# Patient Record
Sex: FEMALE | Race: WHITE | NOT HISPANIC OR LATINO | Employment: OTHER
[De-identification: names, ages, dates, MRNs, and addresses within clinical notes are randomized per-mention and may not be internally consistent; named-entity substitution may affect disease eponyms.]

---

## 2017-05-05 ENCOUNTER — CHARTING TRANS (OUTPATIENT)
Dept: OTHER | Age: 62
End: 2017-05-05

## 2017-06-22 ENCOUNTER — HOSPITAL (OUTPATIENT)
Dept: OTHER | Age: 62
End: 2017-06-22
Attending: OBSTETRICS & GYNECOLOGY

## 2017-06-22 ENCOUNTER — IMAGING SERVICES (OUTPATIENT)
Dept: OTHER | Age: 62
End: 2017-06-22

## 2017-10-06 PROCEDURE — 87493 C DIFF AMPLIFIED PROBE: CPT | Performed by: PHYSICIAN ASSISTANT

## 2017-10-06 PROCEDURE — 87427 SHIGA-LIKE TOXIN AG IA: CPT | Performed by: PHYSICIAN ASSISTANT

## 2017-10-06 PROCEDURE — 87045 FECES CULTURE AEROBIC BACT: CPT | Performed by: PHYSICIAN ASSISTANT

## 2017-10-06 PROCEDURE — 87046 STOOL CULTR AEROBIC BACT EA: CPT | Performed by: PHYSICIAN ASSISTANT

## 2017-12-20 ENCOUNTER — IMAGING SERVICES (OUTPATIENT)
Dept: OTHER | Age: 62
End: 2017-12-20

## 2017-12-20 ENCOUNTER — HOSPITAL (OUTPATIENT)
Dept: OTHER | Age: 62
End: 2017-12-20
Attending: OBSTETRICS & GYNECOLOGY

## 2018-09-28 ENCOUNTER — TELEPHONE (OUTPATIENT)
Dept: NEUROLOGY | Facility: CLINIC | Age: 63
End: 2018-09-28

## 2018-09-28 NOTE — TELEPHONE ENCOUNTER
Notes from patients eye exam were received on 9/28/18 and placed in Dr folder for review. Patient has appointment on 10/16/18.

## 2018-10-12 ENCOUNTER — HOSPITAL ENCOUNTER (OUTPATIENT)
Dept: MRI IMAGING | Facility: HOSPITAL | Age: 63
Discharge: HOME | End: 2018-10-12
Attending: OPHTHALMOLOGY
Payer: MEDICARE

## 2018-10-12 DIAGNOSIS — G43.109 CLASSICAL MIGRAINE: ICD-10-CM

## 2018-10-12 PROCEDURE — 70553 MRI BRAIN STEM W/O & W/DYE: CPT | Performed by: OPHTHALMOLOGY

## 2018-10-12 PROCEDURE — A9575 INJ GADOTERATE MEGLUMI 0.1ML: HCPCS | Performed by: OPHTHALMOLOGY

## 2018-10-15 ENCOUNTER — IMAGING SERVICES (OUTPATIENT)
Dept: OTHER | Age: 63
End: 2018-10-15

## 2018-10-15 ENCOUNTER — HOSPITAL (OUTPATIENT)
Dept: OTHER | Age: 63
End: 2018-10-15
Attending: OBSTETRICS & GYNECOLOGY

## 2018-10-16 ENCOUNTER — OFFICE VISIT (OUTPATIENT)
Dept: NEUROLOGY | Facility: CLINIC | Age: 63
End: 2018-10-16
Payer: MEDICARE

## 2018-10-16 VITALS
WEIGHT: 165 LBS | DIASTOLIC BLOOD PRESSURE: 70 MMHG | BODY MASS INDEX: 27 KG/M2 | HEART RATE: 88 BPM | RESPIRATION RATE: 16 BRPM | SYSTOLIC BLOOD PRESSURE: 110 MMHG

## 2018-10-16 DIAGNOSIS — G43.109 OCULAR MIGRAINE: ICD-10-CM

## 2018-10-16 DIAGNOSIS — G43.109 MIGRAINE WITH AURA AND WITHOUT STATUS MIGRAINOSUS, NOT INTRACTABLE: ICD-10-CM

## 2018-10-16 DIAGNOSIS — H53.9 VISUAL CHANGES: Primary | ICD-10-CM

## 2018-10-16 PROCEDURE — 99204 OFFICE O/P NEW MOD 45 MIN: CPT | Performed by: OTHER

## 2018-10-16 NOTE — PROGRESS NOTES
[facility]   Neurology; INITIAL CLINIC VISIT  10/16/2018, 2:59 PM     Gold Isaac Milton Patient Status:  No patient class for patient encounter    1955 MRN [id number]   Location [address] [place] [address], Allergies    MEDICATIONS:    Current Outpatient Medications:  latanoprost 0.005 % Ophthalmic Solution INT 1 GTT INTO OU NIGHTLY Disp:  Rfl: 5         REVIEW OF SYSTEMS:    GENERAL HEALTH:  feels well, calm, normal appetite,   EYES: no visual complaints or Horrizontal and vertical movements normal.  Normal pursuit and saccades.                             No nystagmus, no ptosis       CN V: Masseters strong, Facial sensations normal,        CN  VII:  Symmetric facial movement       CN VIII:  Normal hearing Neuromuscular/ Electrodiagnostic Specialist  [facility] PATRICIA [facility]  10/16/2018

## 2018-10-16 NOTE — PROGRESS NOTES
New patient here for evaluation of vision disturbances, \"a while\", not going away, sees \"waves\" and sensitive to light.

## 2018-10-16 NOTE — PATIENT INSTRUCTIONS
Refill policies:    • Allow 2-3 business days for refills; controlled substances may take longer.   • Contact your pharmacy at least 5 days prior to running out of medication and have them send an electronic request or submit request through the “request re entire amount billed. Precertification and Prior Authorizations: If your physician has recommended that you have a procedure or additional testing performed.   [facility]) will contact your insurance carrier to obtain pre-certi

## 2018-12-17 ENCOUNTER — OFFICE VISIT (OUTPATIENT)
Dept: OBGYN | Age: 63
End: 2018-12-17

## 2018-12-17 VITALS
DIASTOLIC BLOOD PRESSURE: 80 MMHG | WEIGHT: 168.5 LBS | HEIGHT: 66 IN | SYSTOLIC BLOOD PRESSURE: 122 MMHG | BODY MASS INDEX: 27.08 KG/M2

## 2018-12-17 DIAGNOSIS — Z11.51 SCREENING FOR HUMAN PAPILLOMAVIRUS (HPV): ICD-10-CM

## 2018-12-17 DIAGNOSIS — Z01.419 ENCOUNTER FOR ROUTINE GYNECOLOGICAL EXAMINATION WITH PAPANICOLAOU SMEAR OF CERVIX: Primary | ICD-10-CM

## 2018-12-17 PROBLEM — Z78.0 POST-MENOPAUSAL: Status: ACTIVE | Noted: 2018-12-17

## 2018-12-17 PROBLEM — E16.2 HYPOGLYCEMIA: Status: ACTIVE | Noted: 2018-12-17

## 2018-12-17 PROBLEM — E03.9 HYPOTHYROID: Status: ACTIVE | Noted: 2018-12-17

## 2018-12-17 PROBLEM — M85.80 OSTEOPENIA: Status: ACTIVE | Noted: 2018-12-17

## 2018-12-17 PROCEDURE — G0101 CA SCREEN;PELVIC/BREAST EXAM: HCPCS | Performed by: OBSTETRICS & GYNECOLOGY

## 2018-12-17 RX ORDER — LATANOPROST 50 UG/ML
SOLUTION/ DROPS OPHTHALMIC
COMMUNITY
Start: 2017-08-11

## 2018-12-17 SDOH — SOCIAL STABILITY: SOCIAL INSECURITY
WITHIN THE LAST YEAR, HAVE YOU BEEN KICKED, HIT, SLAPPED, OR OTHERWISE PHYSICALLY HURT BY YOUR PARTNER OR EX-PARTNER?: NO

## 2018-12-17 SDOH — SOCIAL STABILITY: SOCIAL INSECURITY: WITHIN THE LAST YEAR, HAVE YOU BEEN AFRAID OF YOUR PARTNER OR EX-PARTNER?: NO

## 2018-12-17 SDOH — ECONOMIC STABILITY: INCOME INSECURITY: HOW HARD IS IT FOR YOU TO PAY FOR THE VERY BASICS LIKE FOOD, HOUSING, MEDICAL CARE, AND HEATING?: PATIENT DECLINED

## 2018-12-17 SDOH — SOCIAL STABILITY: SOCIAL INSECURITY
WITHIN THE LAST YEAR, HAVE TO BEEN RAPED OR FORCED TO HAVE ANY KIND OF SEXUAL ACTIVITY BY YOUR PARTNER OR EX-PARTNER?: NO

## 2018-12-17 SDOH — SOCIAL STABILITY: SOCIAL INSECURITY: WITHIN THE LAST YEAR, HAVE YOU BEEN HUMILIATED OR EMOTIONALLY ABUSED IN OTHER WAYS BY YOUR PARTNER OR EX-PARTNER?: NO

## 2018-12-26 LAB — HPV16+18+45 E6+E7MRNA CVX NAA+PROBE: NORMAL

## 2018-12-26 RX ORDER — FESOTERODINE FUMARATE 4 MG/1
4 TABLET, EXTENDED RELEASE ORAL DAILY
Qty: 90 TABLET | Refills: 3 | Status: SHIPPED | OUTPATIENT
Start: 2018-12-26 | End: 2019-12-18 | Stop reason: ALTCHOICE

## 2019-01-01 ENCOUNTER — EXTERNAL RECORD (OUTPATIENT)
Dept: HEALTH INFORMATION MANAGEMENT | Facility: OTHER | Age: 64
End: 2019-01-01

## 2019-05-20 ENCOUNTER — TELEPHONE (OUTPATIENT)
Dept: OBGYN | Age: 64
End: 2019-05-20

## 2019-05-29 RX ORDER — ESTRADIOL 0.52 MG/.87G
GEL, METERED TOPICAL
Qty: 52 G | Refills: 3 | Status: SHIPPED | OUTPATIENT
Start: 2019-05-29 | End: 2019-12-18 | Stop reason: SDUPTHER

## 2019-08-20 DIAGNOSIS — Z12.39 SCREENING FOR MALIGNANT NEOPLASM OF BREAST: Primary | ICD-10-CM

## 2019-08-28 ENCOUNTER — TELEPHONE (OUTPATIENT)
Dept: SCHEDULING | Age: 64
End: 2019-08-28

## 2019-08-30 ENCOUNTER — TELEPHONE (OUTPATIENT)
Dept: NEUROLOGY | Facility: CLINIC | Age: 64
End: 2019-08-30

## 2019-10-22 ENCOUNTER — HOSPITAL (OUTPATIENT)
Dept: OTHER | Age: 64
End: 2019-10-22
Attending: OBSTETRICS & GYNECOLOGY

## 2019-10-22 DIAGNOSIS — R92.8 ABNORMAL MAMMOGRAM: Primary | ICD-10-CM

## 2019-10-29 ENCOUNTER — HOSPITAL (OUTPATIENT)
Dept: OTHER | Age: 64
End: 2019-10-29
Attending: OBSTETRICS & GYNECOLOGY

## 2019-12-02 PROBLEM — H53.9 VISUAL CHANGES: Status: RESOLVED | Noted: 2018-10-16 | Resolved: 2019-12-02

## 2019-12-02 PROBLEM — E16.2 HYPOGLYCEMIA: Status: ACTIVE | Noted: 2018-12-17

## 2019-12-02 PROBLEM — M85.80 OSTEOPENIA: Status: ACTIVE | Noted: 2018-12-17

## 2019-12-02 PROBLEM — G43.109 OCULAR MIGRAINE: Status: RESOLVED | Noted: 2018-10-16 | Resolved: 2019-12-02

## 2019-12-02 PROBLEM — M54.50 LOW BACK PAIN: Status: ACTIVE | Noted: 2017-01-25

## 2019-12-02 PROBLEM — G43.109 MIGRAINE WITH AURA: Status: RESOLVED | Noted: 2018-10-16 | Resolved: 2019-12-02

## 2019-12-02 PROBLEM — N32.81 OAB (OVERACTIVE BLADDER): Status: ACTIVE | Noted: 2019-12-02

## 2019-12-18 ENCOUNTER — OFFICE VISIT (OUTPATIENT)
Dept: OBGYN | Age: 64
End: 2019-12-18

## 2019-12-18 VITALS
WEIGHT: 169.6 LBS | DIASTOLIC BLOOD PRESSURE: 80 MMHG | HEIGHT: 66 IN | SYSTOLIC BLOOD PRESSURE: 122 MMHG | BODY MASS INDEX: 27.26 KG/M2

## 2019-12-18 DIAGNOSIS — Z78.0 POST-MENOPAUSAL: ICD-10-CM

## 2019-12-18 DIAGNOSIS — N32.81 OVERACTIVE BLADDER: Primary | ICD-10-CM

## 2019-12-18 PROCEDURE — 99214 OFFICE O/P EST MOD 30 MIN: CPT | Performed by: OBSTETRICS & GYNECOLOGY

## 2019-12-18 ASSESSMENT — PATIENT HEALTH QUESTIONNAIRE - PHQ9
SUM OF ALL RESPONSES TO PHQ9 QUESTIONS 1 AND 2: 0
1. LITTLE INTEREST OR PLEASURE IN DOING THINGS: NOT AT ALL
SUM OF ALL RESPONSES TO PHQ9 QUESTIONS 1 AND 2: 0
2. FEELING DOWN, DEPRESSED OR HOPELESS: NOT AT ALL

## 2020-07-09 ENCOUNTER — LAB ENCOUNTER (OUTPATIENT)
Dept: LAB | Facility: HOSPITAL | Age: 65
End: 2020-07-09
Attending: NURSE PRACTITIONER
Payer: MEDICARE

## 2020-07-09 DIAGNOSIS — Z96.649 HIP JOINT REPLACEMENT BY OTHER MEANS: Primary | ICD-10-CM

## 2020-07-09 LAB
BASOPHILS # BLD AUTO: 0.03 X10(3) UL (ref 0–0.2)
BASOPHILS NFR BLD AUTO: 0.7 %
CRP SERPL-MCNC: <0.29 MG/DL (ref ?–0.3)
DEPRECATED RDW RBC AUTO: 46 FL (ref 35.1–46.3)
EOSINOPHIL # BLD AUTO: 0.17 X10(3) UL (ref 0–0.7)
EOSINOPHIL NFR BLD AUTO: 4 %
ERYTHROCYTE [DISTWIDTH] IN BLOOD BY AUTOMATED COUNT: 13.4 % (ref 11–15)
HCT VFR BLD AUTO: 40.9 % (ref 35–48)
HGB BLD-MCNC: 13.1 G/DL (ref 12–16)
IMM GRANULOCYTES # BLD AUTO: 0.01 X10(3) UL (ref 0–1)
IMM GRANULOCYTES NFR BLD: 0.2 %
LYMPHOCYTES # BLD AUTO: 1.66 X10(3) UL (ref 1–4)
LYMPHOCYTES NFR BLD AUTO: 38.7 %
MCH RBC QN AUTO: 29.7 PG (ref 26–34)
MCHC RBC AUTO-ENTMCNC: 32 G/DL (ref 31–37)
MCV RBC AUTO: 92.7 FL (ref 80–100)
MONOCYTES # BLD AUTO: 0.48 X10(3) UL (ref 0.1–1)
MONOCYTES NFR BLD AUTO: 11.2 %
NEUTROPHILS # BLD AUTO: 1.94 X10 (3) UL (ref 1.5–7.7)
NEUTROPHILS # BLD AUTO: 1.94 X10(3) UL (ref 1.5–7.7)
NEUTROPHILS NFR BLD AUTO: 45.2 %
PLATELET # BLD AUTO: 191 10(3)UL (ref 150–450)
RBC # BLD AUTO: 4.41 X10(6)UL (ref 3.8–5.3)
SED RATE-ML: 9 MM/HR (ref 0–25)
WBC # BLD AUTO: 4.3 X10(3) UL (ref 4–11)

## 2020-07-09 PROCEDURE — 85025 COMPLETE CBC W/AUTO DIFF WBC: CPT

## 2020-07-09 PROCEDURE — 36415 COLL VENOUS BLD VENIPUNCTURE: CPT

## 2020-07-09 PROCEDURE — 86140 C-REACTIVE PROTEIN: CPT

## 2020-07-09 PROCEDURE — 85652 RBC SED RATE AUTOMATED: CPT

## 2020-09-15 RX ORDER — ESTRADIOL 0.52 MG/.87G
GEL, METERED TOPICAL
Qty: 52 G | Refills: 3 | Status: SHIPPED | OUTPATIENT
Start: 2020-09-15 | End: 2020-12-22 | Stop reason: SDUPTHER

## 2020-10-05 ENCOUNTER — TELEPHONE (OUTPATIENT)
Dept: OBGYN | Age: 65
End: 2020-10-05

## 2020-11-02 RX ORDER — MIRABEGRON 25 MG/1
TABLET, FILM COATED, EXTENDED RELEASE ORAL
Qty: 90 TABLET | Refills: 0 | Status: SHIPPED | OUTPATIENT
Start: 2020-11-02 | End: 2020-12-22 | Stop reason: DRUGHIGH

## 2020-11-09 ENCOUNTER — TELEPHONE (OUTPATIENT)
Dept: OBGYN | Age: 65
End: 2020-11-09

## 2020-11-09 DIAGNOSIS — R92.30 DENSE BREAST TISSUE: ICD-10-CM

## 2020-11-09 DIAGNOSIS — Z12.31 ENCOUNTER FOR SCREENING MAMMOGRAM FOR MALIGNANT NEOPLASM OF BREAST: Primary | ICD-10-CM

## 2020-11-10 PROBLEM — R92.30 DENSE BREAST TISSUE: Status: ACTIVE | Noted: 2020-11-10

## 2020-12-02 ENCOUNTER — HOSPITAL ENCOUNTER (OUTPATIENT)
Dept: ULTRASOUND IMAGING | Age: 65
Discharge: HOME | End: 2020-12-02
Attending: OBSTETRICS & GYNECOLOGY

## 2020-12-02 ENCOUNTER — HOSPITAL ENCOUNTER (OUTPATIENT)
Dept: MAMMOGRAPHY | Age: 65
Discharge: HOME | End: 2020-12-02
Attending: OBSTETRICS & GYNECOLOGY

## 2020-12-02 DIAGNOSIS — R92.30 DENSE BREAST TISSUE: ICD-10-CM

## 2020-12-02 DIAGNOSIS — Z12.31 ENCOUNTER FOR SCREENING MAMMOGRAM FOR MALIGNANT NEOPLASM OF BREAST: ICD-10-CM

## 2020-12-02 PROCEDURE — 76641 ULTRASOUND BREAST COMPLETE: CPT

## 2020-12-02 PROCEDURE — 77067 SCR MAMMO BI INCL CAD: CPT

## 2020-12-03 DIAGNOSIS — N63.20 BREAST MASS, LEFT: ICD-10-CM

## 2020-12-03 DIAGNOSIS — R92.8 ABNORMAL MAMMOGRAM: Primary | ICD-10-CM

## 2020-12-03 PROBLEM — E16.2 HYPOGLYCEMIA: Status: RESOLVED | Noted: 2018-12-17 | Resolved: 2020-12-03

## 2020-12-04 ENCOUNTER — HOSPITAL ENCOUNTER (OUTPATIENT)
Dept: ULTRASOUND IMAGING | Age: 65
Discharge: HOME | End: 2020-12-04
Attending: OBSTETRICS & GYNECOLOGY

## 2020-12-04 DIAGNOSIS — N63.20 LEFT BREAST MASS: ICD-10-CM

## 2020-12-04 DIAGNOSIS — R92.8 ABNORMAL ULTRASOUND OF BREAST: ICD-10-CM

## 2020-12-04 DIAGNOSIS — N63.20 BREAST MASS, LEFT: Primary | ICD-10-CM

## 2020-12-04 PROCEDURE — 76642 ULTRASOUND BREAST LIMITED: CPT

## 2020-12-11 ENCOUNTER — APPOINTMENT (OUTPATIENT)
Dept: MAMMOGRAPHY | Age: 65
End: 2020-12-11
Attending: OBSTETRICS & GYNECOLOGY

## 2020-12-21 ENCOUNTER — HOSPITAL ENCOUNTER (OUTPATIENT)
Dept: MAMMOGRAPHY | Age: 65
Discharge: HOME | End: 2020-12-21
Attending: OBSTETRICS & GYNECOLOGY

## 2020-12-21 ENCOUNTER — HOSPITAL ENCOUNTER (OUTPATIENT)
Dept: ULTRASOUND IMAGING | Age: 65
Discharge: HOME | End: 2020-12-21
Attending: OBSTETRICS & GYNECOLOGY

## 2020-12-21 DIAGNOSIS — N63.20 LEFT BREAST MASS: ICD-10-CM

## 2020-12-21 DIAGNOSIS — N63.20 BREAST MASS, LEFT: ICD-10-CM

## 2020-12-21 PROCEDURE — 77065 DX MAMMO INCL CAD UNI: CPT

## 2020-12-21 PROCEDURE — 10006027 HB SUPPLY 278

## 2020-12-21 PROCEDURE — A4648 IMPLANTABLE TISSUE MARKER: HCPCS

## 2020-12-21 PROCEDURE — 19083 BX BREAST 1ST LESION US IMAG: CPT

## 2020-12-21 PROCEDURE — 10006023 HB SUPPLY 272

## 2020-12-21 PROCEDURE — 88305 TISSUE EXAM BY PATHOLOGIST: CPT | Performed by: OBSTETRICS & GYNECOLOGY

## 2020-12-21 RX ORDER — LIDOCAINE HYDROCHLORIDE 20 MG/ML
5 INJECTION, SOLUTION INFILTRATION; PERINEURAL ONCE
Status: DISCONTINUED | OUTPATIENT
Start: 2020-12-21 | End: 2020-12-23 | Stop reason: HOSPADM

## 2020-12-22 ENCOUNTER — OFFICE VISIT (OUTPATIENT)
Dept: OBGYN | Age: 65
End: 2020-12-22

## 2020-12-22 VITALS
BODY MASS INDEX: 26.63 KG/M2 | SYSTOLIC BLOOD PRESSURE: 113 MMHG | HEART RATE: 75 BPM | WEIGHT: 165.68 LBS | DIASTOLIC BLOOD PRESSURE: 72 MMHG | TEMPERATURE: 97.5 F | HEIGHT: 66 IN

## 2020-12-22 DIAGNOSIS — Z13.820 SCREENING FOR OSTEOPOROSIS: Primary | ICD-10-CM

## 2020-12-22 LAB
ASR DISCLAIMER: NORMAL
CASE RPRT: NORMAL
CLINICAL INFO: NORMAL
PATH REPORT.FINAL DX SPEC: NORMAL
PATH REPORT.GROSS SPEC: NORMAL

## 2020-12-22 PROCEDURE — G0101 CA SCREEN;PELVIC/BREAST EXAM: HCPCS | Performed by: OBSTETRICS & GYNECOLOGY

## 2020-12-22 RX ORDER — ESTRADIOL 0.52 MG/.87G
2 GEL, METERED TOPICAL DAILY
Qty: 156 G | Refills: 4 | Status: SHIPPED | OUTPATIENT
Start: 2020-12-22 | End: 2021-12-27 | Stop reason: SDUPTHER

## 2020-12-22 RX ORDER — LEVOTHYROXINE SODIUM 0.05 MG/1
TABLET ORAL
COMMUNITY
Start: 2020-12-07 | End: 2022-09-22 | Stop reason: ALTCHOICE

## 2021-02-18 ENCOUNTER — DOCUMENTATION (OUTPATIENT)
Dept: OBGYN | Age: 66
End: 2021-02-18

## 2021-06-01 ENCOUNTER — TELEPHONE (OUTPATIENT)
Dept: OBGYN | Age: 66
End: 2021-06-01

## 2021-06-01 DIAGNOSIS — R92.8 ABNORMAL MAMMOGRAM: Primary | ICD-10-CM

## 2021-06-01 DIAGNOSIS — M85.80 OSTEOPENIA, UNSPECIFIED LOCATION: ICD-10-CM

## 2021-06-01 DIAGNOSIS — N95.9 MENOPAUSAL AND POSTMENOPAUSAL DISORDER: Primary | ICD-10-CM

## 2021-06-22 ENCOUNTER — HOSPITAL ENCOUNTER (OUTPATIENT)
Dept: MAMMOGRAPHY | Age: 66
Discharge: HOME | End: 2021-06-22
Attending: OBSTETRICS & GYNECOLOGY

## 2021-06-22 ENCOUNTER — HOSPITAL ENCOUNTER (OUTPATIENT)
Dept: GENERAL RADIOLOGY | Age: 66
Discharge: HOME | End: 2021-06-22
Attending: OBSTETRICS & GYNECOLOGY

## 2021-06-22 DIAGNOSIS — R92.8 ABNORMAL MAMMOGRAM: ICD-10-CM

## 2021-06-22 DIAGNOSIS — M85.80 OSTEOPENIA, UNSPECIFIED LOCATION: ICD-10-CM

## 2021-06-22 DIAGNOSIS — N63.20 BREAST MASS, LEFT: ICD-10-CM

## 2021-06-22 DIAGNOSIS — N95.9 MENOPAUSAL AND POSTMENOPAUSAL DISORDER: ICD-10-CM

## 2021-06-22 PROCEDURE — 77080 DXA BONE DENSITY AXIAL: CPT

## 2021-06-22 PROCEDURE — G0279 TOMOSYNTHESIS, MAMMO: HCPCS

## 2021-09-07 ENCOUNTER — TELEPHONE (OUTPATIENT)
Dept: OBGYN | Age: 66
End: 2021-09-07

## 2021-09-08 DIAGNOSIS — Z12.31 ENCOUNTER FOR SCREENING MAMMOGRAM FOR MALIGNANT NEOPLASM OF BREAST: Primary | ICD-10-CM

## 2021-09-10 ENCOUNTER — TELEPHONE (OUTPATIENT)
Dept: OBGYN | Age: 66
End: 2021-09-10

## 2021-12-03 ENCOUNTER — HOSPITAL ENCOUNTER (OUTPATIENT)
Dept: MAMMOGRAPHY | Age: 66
Discharge: HOME | End: 2021-12-03
Attending: OBSTETRICS & GYNECOLOGY

## 2021-12-03 DIAGNOSIS — Z12.31 VISIT FOR SCREENING MAMMOGRAM: ICD-10-CM

## 2021-12-03 PROCEDURE — 77063 BREAST TOMOSYNTHESIS BI: CPT

## 2021-12-27 ENCOUNTER — OFFICE VISIT (OUTPATIENT)
Dept: OBGYN | Age: 66
End: 2021-12-27

## 2021-12-27 VITALS
SYSTOLIC BLOOD PRESSURE: 119 MMHG | WEIGHT: 170.31 LBS | HEIGHT: 66 IN | BODY MASS INDEX: 27.37 KG/M2 | HEART RATE: 82 BPM | DIASTOLIC BLOOD PRESSURE: 72 MMHG

## 2021-12-27 DIAGNOSIS — Z78.0 POST-MENOPAUSAL: ICD-10-CM

## 2021-12-27 DIAGNOSIS — N32.81 OVERACTIVE BLADDER: Primary | ICD-10-CM

## 2021-12-27 PROCEDURE — 99397 PER PM REEVAL EST PAT 65+ YR: CPT | Performed by: OBSTETRICS & GYNECOLOGY

## 2021-12-27 RX ORDER — ESTRADIOL 0.52 MG/.87G
2 GEL, METERED TOPICAL DAILY
Qty: 156 G | Refills: 4 | Status: SHIPPED | OUTPATIENT
Start: 2021-12-27 | End: 2022-09-22 | Stop reason: ALTCHOICE

## 2021-12-29 PROBLEM — R92.2 DENSE BREAST TISSUE: Status: ACTIVE | Noted: 2020-11-10

## 2021-12-29 PROBLEM — R92.30 DENSE BREAST TISSUE: Status: ACTIVE | Noted: 2020-11-10

## 2022-06-27 ENCOUNTER — TELEPHONE (OUTPATIENT)
Dept: OBGYN | Age: 67
End: 2022-06-27

## 2022-07-08 ENCOUNTER — TELEPHONE (OUTPATIENT)
Dept: OBGYN | Age: 67
End: 2022-07-08

## 2022-08-11 ENCOUNTER — ORDER TRANSCRIPTION (OUTPATIENT)
Dept: PHYSICAL THERAPY | Facility: HOSPITAL | Age: 67
End: 2022-08-11

## 2022-08-11 DIAGNOSIS — N39.3 STRESS INCONTINENCE, FEMALE: Primary | ICD-10-CM

## 2022-08-11 DIAGNOSIS — N39.41 URGE INCONTINENCE: ICD-10-CM

## 2022-08-11 DIAGNOSIS — R35.0 URINARY FREQUENCY: ICD-10-CM

## 2022-08-17 ENCOUNTER — TELEPHONE (OUTPATIENT)
Dept: FAMILY MEDICINE | Age: 67
End: 2022-08-17

## 2022-09-15 ENCOUNTER — OFFICE VISIT (OUTPATIENT)
Dept: PHYSICAL THERAPY | Age: 67
End: 2022-09-15
Attending: OBSTETRICS & GYNECOLOGY
Payer: MEDICARE

## 2022-09-15 DIAGNOSIS — N39.3 STRESS INCONTINENCE, FEMALE: ICD-10-CM

## 2022-09-15 DIAGNOSIS — N39.41 URGE INCONTINENCE: ICD-10-CM

## 2022-09-15 DIAGNOSIS — R35.0 URINARY FREQUENCY: ICD-10-CM

## 2022-09-15 PROCEDURE — 97110 THERAPEUTIC EXERCISES: CPT

## 2022-09-15 PROCEDURE — 97161 PT EVAL LOW COMPLEX 20 MIN: CPT

## 2022-09-15 NOTE — PROGRESS NOTES
MUSCULOSKELETAL AND PELVIC FLOOR EVALUATION:     Diagnosis:   Stress incontinence, female (N39.3)  Urge incontinence (N39.41)  Urinary frequency (R35.0)     Referring Provider: Emerson Farley  Date of Evaluation:    9/15/2022    Precautions:  None Next MD visit:   none scheduled  Date of Surgery: n/a     PATIENT SUMMARY   Annemarie Rose is a 69year old female  who presents to therapy today with complaints of urinary leaking with cough, sneeze and urgency. She also leaks with rotation during impact of hitting golf ball. She reports normal daily bowel movements. Current symptoms include: leakage    Pt describes pain level:  None     Pregnant Now: No  Obstetrical/Gynecological history:2 vaginal deliveries and 1  10 lbs third child  Urodynamic Test: yes, pt report she holds large amounts of fluid  Manometry: no  Occupation/Activities: retired,  golfer      POPDI-6 : 8.33  CRAD-8: 21.88  NATHALY-6: 54.17  PFDI-20 : 84.38/300    Issa Henson describes prior level of function used a panty liner. . Pt goals include less leaking anPast medical history was reviewed with Issa Henson. Significant findings include  R NEREYDA 2016, d use of panty liner. Patient   has a past medical history of BACK PAIN, Bursitis (2009), Esophageal reflux (10/24/2016), Herpes zoster without mention of complication, Hypothyroid (2009), HYPOTHYROIDISM, Migraine with aura (10/16/2018), Ocular migraine (10/16/2018), OTHER DISEASES (4/10), Other disorder of menstruation and other abnormal bleeding from female genital tract, and Visual changes (10/16/2018    URINARY HABITS  Types of symptoms: stress incontinence and urge incontinence : cough, sneeze, , golfing- impact, urge UI -small amounts - at least once day. Abdominal/Vaginal Pressure complaints: No    Urinary Frequency:  2-3 hours  Amount:  filling panty liner  Pad use: Wearing mesh pad.    Nocturia:  0 time    Fluid Intake:  large smoothly in morning 12- -16 oz, 1 bottle electrolytes 12 oz 2- 6 oz  Bladder irritants:  1-2 cups of decaf coffee  Post void dribble:  no  Empty bladder just in case: Yes  Do you ever leak urine without knowing it? no    BOWEL HABITS  Types of symptoms: Normal and sometimes a softer stool  Frequency of bowel movements:  1 time a day  Do you strain with defecation: Yes   Laxative use: No    SEXUAL HEALTH STATUS  Sexual [place] Status:  no  Pain with initial and/or deep penetration: NA  Pain with pelvic exam/tampon use: NA    ASSESSMENT  Gabbie Woods presents to physical therapy evaluation with primary c/o UI with cough, sneeze and urgency. The results of the objective tests and measures show pelvic floor weakness and poor bladder habits. Functional deficits include but are not limited to leaking with cough, sneeze and urgency . Signs and symptoms are consistent with diagnosis of \Stress incontinence, female (N39.3), Urge incontinence (N39.41), Urinary frequency (R35.0). Pt and PT discussed evaluation findings, pathology, POC and HEP. Pt voiced understanding and performs HEP correctly without reported pain. Skilled Pelvic Physical Therapy is medically necessary to address the above impairments and reach functional goals. Patient will benefit from therapy to receive manual therapy for joint and soft tissue manipulation; neuromuscular re-education for pelvic floor coordination and therapeutic exercise for internal and external stretching and functional strengthening. Pt will also benefit from bladder education including urge deferment. Progress might be delayed as patient may be traveling out of state to help daughter with arrival of first grandchild. If this is the case, virtual visits would be appropriate.      OBJECTIVE:       Informed consent for internal pelvic evaluation given: Yes    External Observation:   Voluntary contraction: present   Voluntary relaxation: present  Involuntary contraction: present  Involuntary relaxation: present    Mons pubis: WNL  Labia majora: WNL  Labia minora: WNL  Urethral meatus: WNL  Introitus: WNL  Perineal body: WNL    Sensory/Reflex:  Vestibule: normal bilaterally  Anal [place]: normal bilaterally    Internal Examination   Scar: NA    Pelvic Floor Muscle strength: (PERF= Power/Endurance/Reps/Fast) MMT: 3+/10/6/not tested   External Anal Sphincter: NA  Accessory Muscle Use: abdominals    Tissue Laxity Test:  Anterior Wall: WNL  Posterior Wall: WNL  Apical: WNL      Internal Palpation: WNL except restriction 0720-6060 and 100-200    Today's Treatment and Response:   Patient education was provided on objective findings of external and internal evaluation and expectations with treatment outcomes. Educated on pelvic anatomy and function with diagrams and pelvic model, adequate hydration levels, instructed in bladder, bowel, and diet diary log and issued handout  and knack/pelvic muscle brace    Patient was instructed in and issued a HEP for: diet/bladder/bowel diary. PF alanis and PF facilitation exercises     Charges: PT Eval Low Complexity, Ex 2      Total Timed Treatment: 90 min     Total Treatment Time: 90 min     PLAN OF CARE:    Goals: (to be met in 8 visits)    Patient will demonstrate pelvic floor contraction average 8.0 mvc in 10 sec and 10 reps to decrease urinary incontinence. Patient will demonstrate pelvic floor resting tone average <4.0 mvr to decrease urgency. Patient will be independent in appropriate bladder habits to reduce UI and urgency. Patient will report no leaking with urgency when utilizing urge deferment exercises. Frequency / Duration: Patient will be seen for 1  x/week or a total of 8 visits over a 90 day period.   Treatment will include: Manual Therapy, Neuromuscular Re-education, Therapeutic Exercise and Home Exercise Program instruction     Education or treatment limitation: None  Rehab Potential:good      Patient/Family/Caregiver was advised of these findings, precautions, and treatment options and has agreed to actively participate in planning and for this course of care. Thank you for your referral. Please co-sign or sign and return this letter via fax as soon as possible to [phone]. If you have any questions, please contact me at Dept: [phone]    Sincerely,  Electronically signed by therapist: Sharon Ray, PT  [de-identified] certification required: Yes  I certify the need for these services furnished under this plan of treatment and while under my care.     X___________________________________________________ Date____________________    Certification From: 4/28/9914  To:12/14/2022

## 2022-09-20 ENCOUNTER — OFFICE VISIT (OUTPATIENT)
Dept: PHYSICAL THERAPY | Age: 67
End: 2022-09-20
Attending: OBSTETRICS & GYNECOLOGY

## 2022-09-20 DIAGNOSIS — N39.41 URGE INCONTINENCE: ICD-10-CM

## 2022-09-20 DIAGNOSIS — R35.0 URINARY FREQUENCY: ICD-10-CM

## 2022-09-20 DIAGNOSIS — N39.3 STRESS INCONTINENCE, FEMALE: ICD-10-CM

## 2022-09-20 PROCEDURE — 97110 THERAPEUTIC EXERCISES: CPT

## 2022-09-20 PROCEDURE — 97112 NEUROMUSCULAR REEDUCATION: CPT

## 2022-09-20 NOTE — PROGRESS NOTES
Diagnosis:      Stress incontinence, female (N39.3)  Urge incontinence (N39.41)  Urinary frequency (R35.0)   Referring Provider: Rockie Runner  Date of Evaluation:    9/20/2022    Precautions:  None Next MD visit:   none scheduled  Date of Surgery: n/a   Insurance Primary/Secondary: MEDICARE / [facility]     # Auth Visits:   8 visits          Subjective: No new complaints    Pain: NA/10      Objective:   Patient was instructed in HEP and issued a handout. Patient demonstrate correct technique with HEP. PF alanis 4-5 mvc for 8 sec. Low resting tone. Indicate poor connection to pelvic floor so not accurate reading but used to cue hold. R knee edema at fat pad and superior to joint      Assessment: Patient challenged with isolating pelvic floor from abdominals. Able to improve with self tactile cue at abdominals. Goals:    (to be met in 8 visits)    Patient will demonstrate pelvic floor contraction average 8.0 mvc in 10 sec and 10 reps to decrease urinary incontinence. Patient will demonstrate pelvic floor resting tone average <4.0 mvr to decrease urgency. Patient will be independent in appropriate bladder habits to reduce UI and urgency. Patient will report no leaking with urgency when utilizing urge deferment exercises. Plan: Continue PT to PF functional training and urge deferment training      Date: 9/20/2022  TX#: 2/8 Date:                 TX#: 3/ Date:                 TX#: 4/ Date:                 TX#: 5/ Date: Tx#: 6/   NMRED:  PF alanis 8 sec x 10     PF pre-activation BKFO alternate x 10    PF pre-activation table top alternate x 5   Review knee exercises issued by ortho PT. Will incorporate PF training in future with these exercise  Review HEP  After session, instruct pt in KT tape patellofemoral fat pad taping. Instruct removal with redness, rash.  Remove slowly and wear no longer than 2 days    Urge deferment training                           HEP:  PF alanis, PF pre-activation : BKFO, alternate table top, continue with PF facilitation bridge with ball squeeze, hip add alanis, seated hip ER GTB    Charges: NMRED2 ( 28 min) Ex 15 min       Total Timed Treatment: 43 min  Total Treatment Time: 43 min       MUSCULOSKELETAL AND PELVIC FLOOR EVALUATION:     Diagnosis:   Stress incontinence, female (N39.3)  Urge incontinence (N39.41)  Urinary frequency (R35.0)     Referring Provider: Dariana Parra  Date of Evaluation:    9/15/2022    Precautions:  None Next MD visit:   none scheduled  Date of Surgery: n/a     PATIENT SUMMARY   Margaret Rose is a 69year old female  who presents to therapy today with complaints of urinary leaking with cough, sneeze and urgency. She also leaks with rotation during impact of hitting golf ball. She reports normal daily bowel movements. Current symptoms include: leakage    Pt describes pain level:  None     Pregnant Now: No  Obstetrical/Gynecological history:2 vaginal deliveries and 1  10 lbs third child  Urodynamic Test: yes, pt report she holds large amounts of fluid  Manometry: no  Occupation/Activities: retired,  golfer      POPDI-6 : 8.33  CRAD-8: 21.88  NATHALY-6: 54.17  PFDI-20 : 84.38/300    Miguelina Garcia describes prior level of function used a panty liner. . Pt goals include less leaking anPast medical history was reviewed with Miguelina Garcia. Significant findings include  R NEREYDA 2016, d use of panty liner. Patient   has a past medical history of BACK PAIN, Bursitis (2009), Esophageal reflux (10/24/2016), Herpes zoster without mention of complication, Hypothyroid (2009), HYPOTHYROIDISM, Migraine with aura (10/16/2018), Ocular migraine (10/16/2018), OTHER DISEASES (4/10), Other disorder of menstruation and other abnormal bleeding from female genital tract, and Visual changes (10/16/2018    URINARY HABITS  Types of symptoms: stress incontinence and urge incontinence : cough, sneeze, , golfing- impact, urge UI -small amounts - at least once day.    Abdominal/Vaginal Pressure complaints: No    Urinary Frequency:  2-3 hours  Amount:  filling panty liner  Pad use: Wearing mesh pad. Nocturia:  0 time    Fluid Intake:  large smoothly in morning 12- -16 oz, 1 bottle electrolytes 12 oz 2- 6 oz  Bladder irritants:  1-2 cups of decaf coffee  Post void dribble:  no  Empty bladder just in case: Yes  Do you ever leak urine without knowing it? no    BOWEL HABITS  Types of symptoms: Normal and sometimes a softer stool  Frequency of bowel movements:  1 time a day  Do you strain with defecation: Yes   Laxative use: No    SEXUAL HEALTH STATUS  Sexual [place] Status:  no  Pain with initial and/or deep penetration: NA  Pain with pelvic exam/tampon use: NA    ASSESSMENT  Issa Henson presents to physical therapy evaluation with primary c/o UI with cough, sneeze and urgency. The results of the objective tests and measures show pelvic floor weakness and poor bladder habits. Functional deficits include but are not limited to leaking with cough, sneeze and urgency . Signs and symptoms are consistent with diagnosis of \Stress incontinence, female (N39.3), Urge incontinence (N39.41), Urinary frequency (R35.0). Pt and PT discussed evaluation findings, pathology, POC and HEP. Pt voiced understanding and performs HEP correctly without reported pain. Skilled Pelvic Physical Therapy is medically necessary to address the above impairments and reach functional goals. Patient will benefit from therapy to receive manual therapy for joint and soft tissue manipulation; neuromuscular re-education for pelvic floor coordination and therapeutic exercise for internal and external stretching and functional strengthening. Pt will also benefit from bladder education including urge deferment. Progress might be delayed as patient may be traveling out of state to help daughter with arrival of first grandchild. If this is the case, virtual visits would be appropriate.      OBJECTIVE:       Informed consent for internal pelvic evaluation given: Yes    External Observation:   Voluntary contraction: present   Voluntary relaxation: present  Involuntary contraction: present  Involuntary relaxation: present    Mons pubis: WNL  Labia majora: WNL  Labia minora: WNL  Urethral meatus: WNL  Introitus: WNL  Perineal body: WNL    Sensory/Reflex:  Vestibule: normal bilaterally  Anal [place]: normal bilaterally    Internal Examination   Scar: NA    Pelvic Floor Muscle strength: (PERF= Power/Endurance/Reps/Fast) MMT: 3+/10/6/not tested   External Anal Sphincter: NA  Accessory Muscle Use: abdominals    Tissue Laxity Test:  Anterior Wall: WNL  Posterior Wall: WNL  Apical: WNL      Internal Palpation: WNL except restriction 9943-4587 and 100-200    Today's Treatment and Response:   Patient education was provided on objective findings of external and internal evaluation and expectations with treatment outcomes. Educated on pelvic anatomy and function with diagrams and pelvic model, adequate hydration levels, instructed in bladder, bowel, and diet diary log and issued handout  and knack/pelvic muscle brace    Patient was instructed in and issued a HEP for: diet/bladder/bowel diary. PF alanis and PF facilitation exercises     Charges: PT Eval Low Complexity, Ex 2      Total Timed Treatment: 90 min     Total Treatment Time: 90 min     PLAN OF CARE:    Goals: (to be met in 8 visits)    Patient will demonstrate pelvic floor contraction average 8.0 mvc in 10 sec and 10 reps to decrease urinary incontinence. Patient will demonstrate pelvic floor resting tone average <4.0 mvr to decrease urgency. Patient will be independent in appropriate bladder habits to reduce UI and urgency. Patient will report no leaking with urgency when utilizing urge deferment exercises. Frequency / Duration: Patient will be seen for 1  x/week or a total of 8 visits over a 90 day period.   Treatment will include: Manual Therapy, Neuromuscular Re-education, Therapeutic Exercise and Home Exercise Program instruction     Education or treatment limitation: None  Rehab Potential:good      Patient/Family/Caregiver was advised of these findings, precautions, and treatment options and has agreed to actively participate in planning and for this course of care. Thank you for your referral. Please co-sign or sign and return this letter via fax as soon as possible to [phone]. If you have any questions, please contact me at Dept: [phone]    Sincerely,  Electronically signed by therapist: Zulay Rey PT  [de-identified] certification required: Yes  I certify the need for these services furnished under this plan of treatment and while under my care.     X___________________________________________________ Date____________________    Certification From: 0/19/3221  To:12/14/2022

## 2022-09-22 ENCOUNTER — OFFICE VISIT (OUTPATIENT)
Dept: OBGYN | Age: 67
End: 2022-09-22

## 2022-09-22 VITALS
BODY MASS INDEX: 25.85 KG/M2 | HEIGHT: 66 IN | SYSTOLIC BLOOD PRESSURE: 116 MMHG | HEART RATE: 74 BPM | WEIGHT: 160.83 LBS | DIASTOLIC BLOOD PRESSURE: 77 MMHG

## 2022-09-22 DIAGNOSIS — N64.4 BREAST PAIN, LEFT: Primary | ICD-10-CM

## 2022-09-22 DIAGNOSIS — N32.81 OVERACTIVE BLADDER: ICD-10-CM

## 2022-09-22 PROCEDURE — 99213 OFFICE O/P EST LOW 20 MIN: CPT | Performed by: OBSTETRICS & GYNECOLOGY

## 2022-09-22 RX ORDER — LEVOTHYROXINE SODIUM 0.07 MG/1
TABLET ORAL
COMMUNITY
Start: 2022-06-27

## 2022-09-22 ASSESSMENT — PATIENT HEALTH QUESTIONNAIRE - PHQ9
SUM OF ALL RESPONSES TO PHQ9 QUESTIONS 1 AND 2: 0
CLINICAL INTERPRETATION OF PHQ2 SCORE: NO FURTHER SCREENING NEEDED
2. FEELING DOWN, DEPRESSED OR HOPELESS: NOT AT ALL
1. LITTLE INTEREST OR PLEASURE IN DOING THINGS: NOT AT ALL
SUM OF ALL RESPONSES TO PHQ9 QUESTIONS 1 AND 2: 0

## 2022-09-27 ENCOUNTER — OFFICE VISIT (OUTPATIENT)
Dept: PHYSICAL THERAPY | Age: 67
End: 2022-09-27
Attending: OBSTETRICS & GYNECOLOGY

## 2022-09-27 DIAGNOSIS — R35.0 URINARY FREQUENCY: ICD-10-CM

## 2022-09-27 DIAGNOSIS — N39.3 STRESS INCONTINENCE, FEMALE: ICD-10-CM

## 2022-09-27 DIAGNOSIS — N39.41 URGE INCONTINENCE: ICD-10-CM

## 2022-09-27 PROCEDURE — 97112 NEUROMUSCULAR REEDUCATION: CPT

## 2022-09-27 NOTE — PROGRESS NOTES
Diagnosis:      Stress incontinence, female (N39.3)  Urge incontinence (N39.41)  Urinary frequency (R35.0)   Referring Provider: Gunner Olivarez  Date of Evaluation:    9/20/2022    Precautions:  None Next MD visit:   none scheduled  Date of Surgery: n/a   Insurance Primary/Secondary: MEDICARE / [facility]     # Auth Visits:   8 visits          Subjective:  Trying to not void as much and can go 4 times a day. Water running at sink can cause urgency especially with a full bladder. .    Pain: NA/10      Objective:   Urge deferment instruction today:   1. Avoiding irritants. HO from [facility] issued. 2. Bladder anatomy and motility education. Instruction of voiding and filling thresholds. 3. Perineal pressure  4. Contract relax PF technique  5. Relaxation breathing. 6 Goal this week q 45 minutes. 7. J.I.C. Avoid just in case  8. Mind over matter strategies  9. Application of strategies with patient specific tasks at home and at work. Assessment: Patient challenged with isolating pelvic floor from abdominals. Able to improve with self tactile cue at abdominals. Goals:    (to be met in 8 visits)    Patient will demonstrate pelvic floor contraction average 8.0 mvc in 10 sec and 10 reps to decrease urinary incontinence. Patient will demonstrate pelvic floor resting tone average <4.0 mvr to decrease urgency. Patient will be independent in appropriate bladder habits to reduce UI and urgency. Patient will report no leaking with urgency when utilizing urge deferment exercises. Plan: Continue PT to PF functional training and urge deferment training      Date: 9/20/2022  TX#: 2/8 Date:  9/27/2022                TX#: 3/8 Date:                 TX#: 4/ Date:                 TX#: 5/ Date: Tx#: 6/   NMRED:  PF alanis 8 sec x 10     PF pre-activation BKFO alternate x 10    PF pre-activation table top alternate x 5   Review knee exercises issued by ortho PT.  Will incorporate PF training in future with these exercise  Review HEP  After session, instruct pt in KT tape patellofemoral fat pad taping. Instruct removal with redness, rash. Remove slowly and wear no longer than 2 days    Urge deferment instruction today:   1. Avoiding irritants. HO from [facility] issued. 2. Bladder anatomy and motility education. Instruction of voiding and filling thresholds. 3. Perineal pressure  4. Contract relax PF technique  5. Relaxation breathing. 6 Goal this week q 45 minutes. 7. J.I.C. Avoid just in case  8. Mind over matter strategies  9. Application of strategies with patient specific tasks at home and at work. TE:   Add PF with sneeze and coughing  Review HEP                    HEP:  PF alanis, PF pre-activation : BKFO, alternate table top, continue with PF facilitation bridge with ball squeeze, hip add alanis, seated hip ER GTB 2022 urge deferment exericses    Charges: NMRED2 ( 33 min) Ex 10 min       Total Timed Treatment: 43 min  Total Treatment Time: 43 min       MUSCULOSKELETAL AND PELVIC FLOOR EVALUATION:     Diagnosis:   Stress incontinence, female (N39.3)  Urge incontinence (N39.41)  Urinary frequency (R35.0)     Referring Provider: Monty Brar  Date of Evaluation:    9/15/2022    Precautions:  None Next MD visit:   none scheduled  Date of Surgery: n/a     PATIENT SUMMARY   Andrew Rose is a 69year old female  who presents to therapy today with complaints of urinary leaking with cough, sneeze and urgency. She also leaks with rotation during impact of hitting golf ball. She reports normal daily bowel movements.        Current symptoms include: leakage    Pt describes pain level:  None     Pregnant Now: No  Obstetrical/Gynecological history:2 vaginal deliveries and 1  10 lbs third child  Urodynamic Test: yes, pt report she holds large amounts of fluid  Manometry: no  Occupation/Activities: retired,  golfer      POPDI-6 : 8.33  CRAD-8: 21.88  NATHALY-6: 54.17  PFDI-20 : 84.38/300    Pérez Peterson describes prior level of function used a panty liner. . Pt goals include less leaking anPast medical history was reviewed with Siddhartha Varela. Significant findings include  R NEREYDA 2016, d use of panty liner. Patient   has a past medical history of BACK PAIN, Bursitis (5/13/2009), Esophageal reflux (10/24/2016), Herpes zoster without mention of complication, Hypothyroid (5/13/2009), HYPOTHYROIDISM, Migraine with aura (10/16/2018), Ocular migraine (10/16/2018), OTHER DISEASES (4/10), Other disorder of menstruation and other abnormal bleeding from female genital tract, and Visual changes (10/16/2018    URINARY HABITS  Types of symptoms: stress incontinence and urge incontinence : cough, sneeze, , golfing- impact, urge UI -small amounts - at least once day. Abdominal/Vaginal Pressure complaints: No    Urinary Frequency:  2-3 hours  Amount:  filling panty liner  Pad use: Wearing mesh pad. Nocturia:  0 time    Fluid Intake:  large smoothly in morning 12- -16 oz, 1 bottle electrolytes 12 oz 2- 6 oz  Bladder irritants:  1-2 cups of decaf coffee  Post void dribble:  no  Empty bladder just in case: Yes  Do you ever leak urine without knowing it? no    BOWEL HABITS  Types of symptoms: Normal and sometimes a softer stool  Frequency of bowel movements:  1 time a day  Do you strain with defecation: Yes   Laxative use: No    SEXUAL HEALTH STATUS  Sexual [place] Status:  no  Pain with initial and/or deep penetration: NA  Pain with pelvic exam/tampon use: NA    ASSESSMENT  Siddhartha Varela presents to physical therapy evaluation with primary c/o UI with cough, sneeze and urgency. The results of the objective tests and measures show pelvic floor weakness and poor bladder habits. Functional deficits include but are not limited to leaking with cough, sneeze and urgency . Signs and symptoms are consistent with diagnosis of \Stress incontinence, female (N39.3), Urge incontinence (N39.41), Urinary frequency (R35.0). Pt and PT discussed evaluation findings, pathology, POC and HEP. Pt voiced understanding and performs HEP correctly without reported pain. Skilled Pelvic Physical Therapy is medically necessary to address the above impairments and reach functional goals. Patient will benefit from therapy to receive manual therapy for joint and soft tissue manipulation; neuromuscular re-education for pelvic floor coordination and therapeutic exercise for internal and external stretching and functional strengthening. Pt will also benefit from bladder education including urge deferment. Progress might be delayed as patient may be traveling out of state to help daughter with arrival of first grandchild. If this is the case, virtual visits would be appropriate. OBJECTIVE:       Informed consent for internal pelvic evaluation given: Yes    External Observation:   Voluntary contraction: present   Voluntary relaxation: present  Involuntary contraction: present  Involuntary relaxation: present    Mons pubis: WNL  Labia majora: WNL  Labia minora: WNL  Urethral meatus: WNL  Introitus: WNL  Perineal body: WNL    Sensory/Reflex:  Vestibule: normal bilaterally  Anal [place]: normal bilaterally    Internal Examination   Scar: NA    Pelvic Floor Muscle strength: (PERF= Power/Endurance/Reps/Fast) MMT: 3+/10/6/not tested   External Anal Sphincter: NA  Accessory Muscle Use: abdominals    Tissue Laxity Test:  Anterior Wall: WNL  Posterior Wall: WNL  Apical: WNL      Internal Palpation: WNL except restriction 4002-4262 and 100-200    Today's Treatment and Response:   Patient education was provided on objective findings of external and internal evaluation and expectations with treatment outcomes. Educated on pelvic anatomy and function with diagrams and pelvic model, adequate hydration levels, instructed in bladder, bowel, and diet diary log and issued handout  and knack/pelvic muscle brace    Patient was instructed in and issued a HEP for: diet/bladder/bowel diary.  PF alanis and PF facilitation exercises     Charges: PT Eval Low Complexity, Ex 2      Total Timed Treatment: 90 min     Total Treatment Time: 90 min     PLAN OF CARE:    Goals: (to be met in 8 visits)    Patient will demonstrate pelvic floor contraction average 8.0 mvc in 10 sec and 10 reps to decrease urinary incontinence. Patient will demonstrate pelvic floor resting tone average <4.0 mvr to decrease urgency. Patient will be independent in appropriate bladder habits to reduce UI and urgency. Patient will report no leaking with urgency when utilizing urge deferment exercises. Frequency / Duration: Patient will be seen for 1  x/week or a total of 8 visits over a 90 day period. Treatment will include: Manual Therapy, Neuromuscular Re-education, Therapeutic Exercise and Home Exercise Program instruction     Education or treatment limitation: None  Rehab Potential:good      Patient/Family/Caregiver was advised of these findings, precautions, and treatment options and has agreed to actively participate in planning and for this course of care. Thank you for your referral. Please co-sign or sign and return this letter via fax as soon as possible to [phone]. If you have any questions, please contact me at Dept: [phone]    Sincerely,  Electronically signed by therapist: Vidal Mcknight PT  [de-identified] certification required: Yes  I certify the need for these services furnished under this plan of treatment and while under my care.     X___________________________________________________ Date____________________    Certification From: 5/31/9543  To:12/14/2022

## 2022-09-30 ENCOUNTER — HOSPITAL ENCOUNTER (OUTPATIENT)
Dept: MAMMOGRAPHY | Age: 67
Discharge: HOME | End: 2022-09-30
Attending: OBSTETRICS & GYNECOLOGY

## 2022-09-30 ENCOUNTER — HOSPITAL ENCOUNTER (OUTPATIENT)
Dept: ULTRASOUND IMAGING | Age: 67
Discharge: HOME | End: 2022-09-30
Attending: OBSTETRICS & GYNECOLOGY

## 2022-09-30 DIAGNOSIS — N64.4 BREAST PAIN, LEFT: ICD-10-CM

## 2022-09-30 PROCEDURE — G0279 TOMOSYNTHESIS, MAMMO: HCPCS

## 2022-09-30 PROCEDURE — 76642 ULTRASOUND BREAST LIMITED: CPT

## 2022-10-04 ENCOUNTER — OFFICE VISIT (OUTPATIENT)
Dept: PHYSICAL THERAPY | Age: 67
End: 2022-10-04
Attending: OBSTETRICS & GYNECOLOGY
Payer: MEDICARE

## 2022-10-04 DIAGNOSIS — R35.0 URINARY FREQUENCY: ICD-10-CM

## 2022-10-04 DIAGNOSIS — N39.3 STRESS INCONTINENCE, FEMALE: ICD-10-CM

## 2022-10-04 DIAGNOSIS — N39.41 URGE INCONTINENCE: ICD-10-CM

## 2022-10-04 PROCEDURE — 97112 NEUROMUSCULAR REEDUCATION: CPT

## 2022-10-04 NOTE — PROGRESS NOTES
Diagnosis:      Stress incontinence, female (N39.3)  Urge incontinence (N39.41)  Urinary frequency (R35.0)   Referring Provider: Heather Cummings  Date of Evaluation:    9/20/2022    Precautions:  None Next MD visit:   none scheduled  Date of Surgery: n/a   Insurance Primary/Secondary: MEDICARE / [facility]     # Auth Visits:   8 visits          Subjective: Per patient: Urodynamic test found normal reserve and good holding. Nurse thinks results look like JUANITA. Pt has not returned to [facility]. Does not think medicines helped her. Slight leak golfing but small amount. .    Pain: NA/10      Objective:   Patient was instructed in HEP and issued a handout. Patient demonstrate correct technique with HEP. Good awareness with contraction in release. Assessment: Patient progressing with pelvic floor strengthening to standing positions. Next session, issue 2 weeks progression as patient is leaving to stay with daughter out of state. Pt may benefit from virtual visit if needed. Goals:    (to be met in 8 visits)    Patient will demonstrate pelvic floor contraction average 8.0 mvc in 10 sec and 10 reps to decrease urinary incontinence. Patient will demonstrate pelvic floor resting tone average <4.0 mvr to decrease urgency. Patient will be independent in appropriate bladder habits to reduce UI and urgency. Patient will report no leaking with urgency when utilizing urge deferment exercises. Plan: Continue PT to PF functional training and urge deferment training      Date: 9/20/2022  TX#: 2/8 Date:  9/27/2022                TX#: 3/8 Date:   10/4/2022               TX#: 4/8 Date:                 TX#: 5/ Date: Tx#: 6/   NMRED:  PF alanis 8 sec x 10     PF pre-activation BKFO alternate x 10    PF pre-activation table top alternate x 5   Review knee exercises issued by ortho PT. Will incorporate PF training in future with these exercise  Review HEP  After session, instruct pt in KT tape patellofemoral fat pad taping.  Instruct removal with redness, rash. Remove slowly and wear no longer than 2 days    Urge deferment instruction today:   1. Avoiding irritants. HO from [facility] issued. 2. Bladder anatomy and motility education. Instruction of voiding and filling thresholds. 3. Perineal pressure  4. Contract relax PF technique  5. Relaxation breathing. 6 Goal this week q 45 minutes. 7. J.I.C. Avoid just in case  8. Mind over matter strategies  9. Application of strategies with patient specific tasks at home and at work. Linda Fishman NMRED:  PF pre-activation sit to stand x 10         PF pre-activation toe tap alternating  X 10       PF pre-activation side stepping 5  feet for 4 laps   Review urge deferment exercises    PF pre-activation BS squat   PF pre-activation forward lunge  Side lunge  Add band with side stepping. double leg hop      TE:   Add PF with sneeze and coughing  Review HEP                    HEP:  PF alanis, PF pre-activation : BKFO, alternate table top, continue with PF facilitation bridge with ball squeeze, hip add alanis, seated hip ER GTB 2022 urge deferment exericses    Charges: [id number]       Total Timed Treatment: 43 min  Total Treatment Time: 43 min       MUSCULOSKELETAL AND PELVIC FLOOR EVALUATION:     Diagnosis:   Stress incontinence, female (N39.3)  Urge incontinence (N39.41)  Urinary frequency (R35.0)     Referring Provider: Lamont Raymundo  Date of Evaluation:    9/15/2022    Precautions:  None Next MD visit:   none scheduled  Date of Surgery: n/a     PATIENT SUMMARY   Aziza Rose is a 69year old female  who presents to therapy today with complaints of urinary leaking with cough, sneeze and urgency. She also leaks with rotation during impact of hitting golf ball. She reports normal daily bowel movements.        Current symptoms include: leakage    Pt describes pain level:  None     Pregnant Now: No  Obstetrical/Gynecological history:2 vaginal deliveries and 1  10 lbs third child  Urodynamic Test: yes, pt report she holds large amounts of fluid  Manometry: no  Occupation/Activities: retired,  golfer      POPDI-6 : 8.33  CRAD-8: 21.88  NATHALY-6: 54.17  PFDI-20 : 84.38/300    Gabriele Vo describes prior level of function used a panty liner. . Pt goals include less leaking anPast medical history was reviewed with Gabriele Vo. Significant findings include  R NEREYDA 2016, d use of panty liner. Patient   has a past medical history of BACK PAIN, Bursitis (5/13/2009), Esophageal reflux (10/24/2016), Herpes zoster without mention of complication, Hypothyroid (5/13/2009), HYPOTHYROIDISM, Migraine with aura (10/16/2018), Ocular migraine (10/16/2018), OTHER DISEASES (4/10), Other disorder of menstruation and other abnormal bleeding from female genital tract, and Visual changes (10/16/2018    URINARY HABITS  Types of symptoms: stress incontinence and urge incontinence : cough, sneeze, , golfing- impact, urge UI -small amounts - at least once day. Abdominal/Vaginal Pressure complaints: No    Urinary Frequency:  2-3 hours  Amount:  filling panty liner  Pad use: Wearing mesh pad. Nocturia:  0 time    Fluid Intake:  large smoothly in morning 12- -16 oz, 1 bottle electrolytes 12 oz 2- 6 oz  Bladder irritants:  1-2 cups of decaf coffee  Post void dribble:  no  Empty bladder just in case: Yes  Do you ever leak urine without knowing it? no    BOWEL HABITS  Types of symptoms: Normal and sometimes a softer stool  Frequency of bowel movements:  1 time a day  Do you strain with defecation: Yes   Laxative use: No    SEXUAL HEALTH STATUS  Sexual [place] Status:  no  Pain with initial and/or deep penetration: NA  Pain with pelvic exam/tampon use: NA    ASSESSMENT  Gabriele Vo presents to physical therapy evaluation with primary c/o UI with cough, sneeze and urgency. The results of the objective tests and measures show pelvic floor weakness and poor bladder habits. Functional deficits include but are not limited to leaking with cough, sneeze and urgency . Signs and symptoms are consistent with diagnosis of \Stress incontinence, female (N39.3), Urge incontinence (N39.41), Urinary frequency (R35.0). Pt and PT discussed evaluation findings, pathology, POC and HEP. Pt voiced understanding and performs HEP correctly without reported pain. Skilled Pelvic Physical Therapy is medically necessary to address the above impairments and reach functional goals. Patient will benefit from therapy to receive manual therapy for joint and soft tissue manipulation; neuromuscular re-education for pelvic floor coordination and therapeutic exercise for internal and external stretching and functional strengthening. Pt will also benefit from bladder education including urge deferment. Progress might be delayed as patient may be traveling out of state to help daughter with arrival of first grandchild. If this is the case, virtual visits would be appropriate. OBJECTIVE:       Informed consent for internal pelvic evaluation given: Yes    External Observation:   Voluntary contraction: present   Voluntary relaxation: present  Involuntary contraction: present  Involuntary relaxation: present    Mons pubis: WNL  Labia majora: WNL  Labia minora: WNL  Urethral meatus: WNL  Introitus: WNL  Perineal body: WNL    Sensory/Reflex:  Vestibule: normal bilaterally  Anal [place]: normal bilaterally    Internal Examination   Scar: NA    Pelvic Floor Muscle strength: (PERF= Power/Endurance/Reps/Fast) MMT: 3+/10/6/not tested   External Anal Sphincter: NA  Accessory Muscle Use: abdominals    Tissue Laxity Test:  Anterior Wall: WNL  Posterior Wall: WNL  Apical: WNL      Internal Palpation: WNL except restriction 4791-1551 and 100-200    Today's Treatment and Response:   Patient education was provided on objective findings of external and internal evaluation and expectations with treatment outcomes.  Educated on pelvic anatomy and function with diagrams and pelvic model, adequate hydration levels, instructed in bladder, bowel, and diet diary log and issued handout  and knack/pelvic muscle brace    Patient was instructed in and issued a HEP for: diet/bladder/bowel diary. PF alanis and PF facilitation exercises     Charges: PT Vic Low Complexity, Ex 2      Total Timed Treatment: 90 min     Total Treatment Time: 90 min     PLAN OF CARE:    Goals: (to be met in 8 visits)    Patient will demonstrate pelvic floor contraction average 8.0 mvc in 10 sec and 10 reps to decrease urinary incontinence. Patient will demonstrate pelvic floor resting tone average <4.0 mvr to decrease urgency. Patient will be independent in appropriate bladder habits to reduce UI and urgency. Patient will report no leaking with urgency when utilizing urge deferment exercises. Frequency / Duration: Patient will be seen for 1  x/week or a total of 8 visits over a 90 day period. Treatment will include: Manual Therapy, Neuromuscular Re-education, Therapeutic Exercise and Home Exercise Program instruction     Education or treatment limitation: None  Rehab Potential:good      Patient/Family/Caregiver was advised of these findings, precautions, and treatment options and has agreed to actively participate in planning and for this course of care. Thank you for your referral. Please co-sign or sign and return this letter via fax as soon as possible to [phone]. If you have any questions, please contact me at Dept: [phone]    Sincerely,  Electronically signed by therapist: Juanita Locke PT  [de-identified] certification required: Yes  I certify the need for these services furnished under this plan of treatment and while under my care.     X___________________________________________________ Date____________________    Certification From: 9/51/6878  To:12/14/2022

## 2022-10-11 ENCOUNTER — OFFICE VISIT (OUTPATIENT)
Dept: PHYSICAL THERAPY | Age: 67
End: 2022-10-11
Attending: OBSTETRICS & GYNECOLOGY
Payer: MEDICARE

## 2022-10-11 DIAGNOSIS — N39.41 URGE INCONTINENCE: ICD-10-CM

## 2022-10-11 DIAGNOSIS — N39.3 STRESS INCONTINENCE, FEMALE: ICD-10-CM

## 2022-10-11 DIAGNOSIS — R35.0 URINARY FREQUENCY: ICD-10-CM

## 2022-10-11 PROCEDURE — 97112 NEUROMUSCULAR REEDUCATION: CPT

## 2022-10-11 NOTE — PROGRESS NOTES
Discharge /Progress NOTESummary        Diagnosis:      Stress incontinence, female (N39.3)  Urge incontinence (N39.41)  Urinary frequency (R35.0)   Referring Provider: Gunner Olivarez  Date of Evaluation:    2022    Precautions:  None Next MD visit:   none scheduled  Date of Surgery: n/a   Insurance Primary/Secondary: MEDICARE / [facility]     # Auth Visits:   8 visits      Pt has attended 5, cancelled 0, and no shown 0 visits in Physical Therapy. Subjective: Patient feels she has enough exercises to continue and be discharged. Pt is going to be out of state caring for  grandchild and will not be able to attend future appts. Pt report remaining symptoms are urgency with running water at sinks and sometimes leak with cough and sneeze. Patient has not been squeezing with cough or sneeze. Pain: NA/10      Objective:   Patient was instructed in HEP and issued a handout. Patient demonstrate correct technique with HEP. Good awareness with contraction in release at 8.0 mvc. Normal resting tone. Assessment: Patient has achieved 2 of 4 PT goals. She is discharged early to continue with HEP due to traveling out of state for long period of time. Pt can call with questions. Pt still working on urge deferment and when to apply the strategies for training. Pt has not applied strategies with running water at sink. Review with patient and provide additional handout. Goals:    (to be met in 8 visits)    Patient will demonstrate pelvic floor contraction average 8.0 mvc in 10 sec and 10 reps to decrease urinary incontinence. MET  Patient will demonstrate pelvic floor resting tone average <4.0 mvr to decrease urgency. MET  Patient will be independent in appropriate bladder habits to reduce UI and urgency. Improving  Patient will report no leaking with urgency when utilizing urge deferment exercises.  Improving      Plan: Discharge to continue HEP and follow up with physician at next appt at end of November. Patient/Family/Caregiver was advised of these findings, precautions, and treatment options and has agreed to actively participate in planning and for this course of care. Thank you for your referral. If you have any questions, please contact me at Dept: [phone]. Sincerely,  Electronically signed by therapist: Dieter Alba, PT    Physician's certification required: Yes  Please co-sign or sign and return this letter via fax as soon as possible to [phone]. I certify the need for these services furnished under this plan of treatment and while under my care. X___________________________________________________ Date____________________    Certification From: 80/68/8130  To:1/9/2023             Date: 9/20/2022  TX#: 2/8 Date:  9/27/2022                TX#: 3/8 Date:   10/4/2022               TX#: 4/8 Date:   10/11/2022               TX#: 5/8 Date: Tx#: 6/   NMRED:  PF alanis 8 sec x 10     PF pre-activation BKFO alternate x 10    PF pre-activation table top alternate x 5   Review knee exercises issued by ortho PT. Will incorporate PF training in future with these exercise  Review HEP  After session, instruct pt in KT tape patellofemoral fat pad taping. Instruct removal with redness, rash. Remove slowly and wear no longer than 2 days    Urge deferment instruction today:   1. Avoiding irritants. HO from [facility] issued. 2. Bladder anatomy and motility education. Instruction of voiding and filling thresholds. 3. Perineal pressure  4. Contract relax PF technique  5. Relaxation breathing. 6 Goal this week q 45 minutes. 7. J.I.C. Avoid just in case  8. Mind over matter strategies  9. Application of strategies with patient specific tasks at home and at work. Russ Gregory   NMRED:  PF pre-activation sit to stand x 10         PF pre-activation toe tap alternating  X 10       PF pre-activation side stepping 5  feet for 4 laps   Review urge deferment exercises    PF pre-activation BS squat  X 10 with arms forward  PF pre-activation forward lunge on step x 10  Side lunge on step x 10 each side  Add band with side stepping. 4 laps with green band  Progression:   Arm raise with lunge  Alt side step    Instruct KNACK with cough and sneeze    Review urge deferment and instruction with application running water. TE:   Add PF with sneeze and coughing  Review HEP                    HEP:  PF alanis, PF pre-activation : BKFO, alternate table top, continue with PF facilitation bridge with ball squeeze, hip add alanis, seated hip ER GTB 2022 urge deferment exericses    Charges: NDFCQ6       Total Timed Treatment: 43 min  Total Treatment Time: 43 min      MUSCULOSKELETAL AND PELVIC FLOOR EVALUATION:     Diagnosis:   Stress incontinence, female (N39.3)  Urge incontinence (N39.41)  Urinary frequency (R35.0)     Referring Provider: Luis Blanton  Date of Evaluation:    9/15/2022    Precautions:  None Next MD visit:   none scheduled  Date of Surgery: n/a     PATIENT SUMMARY   Krystin Rose is a 69year old female  who presents to therapy today with complaints of urinary leaking with cough, sneeze and urgency. She also leaks with rotation during impact of hitting golf ball. She reports normal daily bowel movements. Current symptoms include: leakage    Pt describes pain level:  None     Pregnant Now: No  Obstetrical/Gynecological history:2 vaginal deliveries and 1  10 lbs third child  Urodynamic Test: yes, pt report she holds large amounts of fluid  Manometry: no  Occupation/Activities: retired,  golfer      POPDI-6 : 8.33  CRAD-8: 21.88  NATHALY-6: 54.17  PFDI-20 : 84.38/300    Ora Bi describes prior level of function used a panty liner. . Pt goals include less leaking anPast medical history was reviewed with Ora Bi. Significant findings include  R NEREYDA 2016, d use of panty liner.      Patient   has a past medical history of BACK PAIN, Bursitis (2009), Esophageal reflux (10/24/2016), Herpes zoster without mention of complication, Hypothyroid (5/13/2009), HYPOTHYROIDISM, Migraine with aura (10/16/2018), Ocular migraine (10/16/2018), OTHER DISEASES (4/10), Other disorder of menstruation and other abnormal bleeding from female genital tract, and Visual changes (10/16/2018    URINARY HABITS  Types of symptoms: stress incontinence and urge incontinence : cough, sneeze, , golfing- impact, urge UI -small amounts - at least once day. Abdominal/Vaginal Pressure complaints: No    Urinary Frequency:  2-3 hours  Amount:  filling panty liner  Pad use: Wearing mesh pad. Nocturia:  0 time    Fluid Intake:  large smoothly in morning 12- -16 oz, 1 bottle electrolytes 12 oz 2- 6 oz  Bladder irritants:  1-2 cups of decaf coffee  Post void dribble:  no  Empty bladder just in case: Yes  Do you ever leak urine without knowing it? no    BOWEL HABITS  Types of symptoms: Normal and sometimes a softer stool  Frequency of bowel movements:  1 time a day  Do you strain with defecation: Yes   Laxative use: No    SEXUAL HEALTH STATUS  Sexual [place] Status:  no  Pain with initial and/or deep penetration: NA  Pain with pelvic exam/tampon use: NA    ASSESSMENT  Ora Bi presents to physical therapy evaluation with primary c/o UI with cough, sneeze and urgency. The results of the objective tests and measures show pelvic floor weakness and poor bladder habits. Functional deficits include but are not limited to leaking with cough, sneeze and urgency . Signs and symptoms are consistent with diagnosis of \Stress incontinence, female (N39.3), Urge incontinence (N39.41), Urinary frequency (R35.0). Pt and PT discussed evaluation findings, pathology, POC and HEP. Pt voiced understanding and performs HEP correctly without reported pain. Skilled Pelvic Physical Therapy is medically necessary to address the above impairments and reach functional goals.  Patient will benefit from therapy to receive manual therapy for joint and soft tissue manipulation; neuromuscular re-education for pelvic floor coordination and therapeutic exercise for internal and external stretching and functional strengthening. Pt will also benefit from bladder education including urge deferment. Progress might be delayed as patient may be traveling out of state to help daughter with arrival of first grandchild. If this is the case, virtual visits would be appropriate. OBJECTIVE:       Informed consent for internal pelvic evaluation given: Yes    External Observation:   Voluntary contraction: present   Voluntary relaxation: present  Involuntary contraction: present  Involuntary relaxation: present    Mons pubis: WNL  Labia majora: WNL  Labia minora: WNL  Urethral meatus: WNL  Introitus: WNL  Perineal body: WNL    Sensory/Reflex:  Vestibule: normal bilaterally  Anal [place]: normal bilaterally    Internal Examination   Scar: NA    Pelvic Floor Muscle strength: (PERF= Power/Endurance/Reps/Fast) MMT: 3+/10/6/not tested   External Anal Sphincter: NA  Accessory Muscle Use: abdominals    Tissue Laxity Test:  Anterior Wall: WNL  Posterior Wall: WNL  Apical: WNL      Internal Palpation: WNL except restriction [id number] and 100-200    Today's Treatment and Response:   Patient education was provided on objective findings of external and internal evaluation and expectations with treatment outcomes. Educated on pelvic anatomy and function with diagrams and pelvic model, adequate hydration levels, instructed in bladder, bowel, and diet diary log and issued handout  and knack/pelvic muscle brace    Patient was instructed in and issued a HEP for: diet/bladder/bowel diary. PF alanis and PF facilitation exercises     Charges: PT Eval Low Complexity, Ex 2      Total Timed Treatment: 90 min     Total Treatment Time: 90 min     PLAN OF CARE:    Goals: (to be met in 8 visits)    Patient will demonstrate pelvic floor contraction average 8.0 mvc in 10 sec and 10 reps to decrease urinary incontinence.    Patient will demonstrate pelvic floor resting tone average <4.0 mvr to decrease urgency. Patient will be independent in appropriate bladder habits to reduce UI and urgency. Patient will report no leaking with urgency when utilizing urge deferment exercises. Frequency / Duration: Patient will be seen for 1  x/week or a total of 8 visits over a 90 day period. Treatment will include: Manual Therapy, Neuromuscular Re-education, Therapeutic Exercise and Home Exercise Program instruction     Education or treatment limitation: None  Rehab Potential:good      Patient/Family/Caregiver was advised of these findings, precautions, and treatment options and has agreed to actively participate in planning and for this course of care. Thank you for your referral. Please co-sign or sign and return this letter via fax as soon as possible to [phone]. If you have any questions, please contact me at Dept: [phone]    Sincerely,  Electronically signed by therapist: Deven Carrera PT  [de-identified] certification required: Yes  I certify the need for these services furnished under this plan of treatment and while under my care.     X___________________________________________________ Date____________________    Certification From: 0/44/6381  To:12/14/2022

## 2022-10-18 ENCOUNTER — APPOINTMENT (OUTPATIENT)
Dept: PHYSICAL THERAPY | Age: 67
End: 2022-10-18
Attending: OBSTETRICS & GYNECOLOGY
Payer: MEDICARE

## 2022-10-25 ENCOUNTER — APPOINTMENT (OUTPATIENT)
Dept: PHYSICAL THERAPY | Age: 67
End: 2022-10-25
Attending: OBSTETRICS & GYNECOLOGY
Payer: MEDICARE

## 2022-11-03 ENCOUNTER — APPOINTMENT (OUTPATIENT)
Dept: PHYSICAL THERAPY | Age: 67
End: 2022-11-03
Attending: OBSTETRICS & GYNECOLOGY
Payer: MEDICARE

## 2022-11-08 ENCOUNTER — APPOINTMENT (OUTPATIENT)
Dept: PHYSICAL THERAPY | Age: 67
End: 2022-11-08
Attending: OBSTETRICS & GYNECOLOGY
Payer: MEDICARE

## 2022-11-15 ENCOUNTER — APPOINTMENT (OUTPATIENT)
Dept: PHYSICAL THERAPY | Age: 67
End: 2022-11-15
Attending: OBSTETRICS & GYNECOLOGY
Payer: MEDICARE

## 2023-07-24 ENCOUNTER — OFFICE VISIT (OUTPATIENT)
Dept: OBGYN | Age: 68
End: 2023-07-24

## 2023-07-24 VITALS
HEART RATE: 76 BPM | WEIGHT: 163.6 LBS | DIASTOLIC BLOOD PRESSURE: 86 MMHG | HEIGHT: 66 IN | BODY MASS INDEX: 26.29 KG/M2 | SYSTOLIC BLOOD PRESSURE: 125 MMHG

## 2023-07-24 DIAGNOSIS — Z12.31 ENCOUNTER FOR SCREENING MAMMOGRAM FOR MALIGNANT NEOPLASM OF BREAST: ICD-10-CM

## 2023-07-24 DIAGNOSIS — N95.1 HOT FLASHES, MENOPAUSAL: Primary | ICD-10-CM

## 2023-07-24 DIAGNOSIS — Z12.39 ENCOUNTER FOR BREAST CANCER SCREENING OTHER THAN MAMMOGRAM: ICD-10-CM

## 2023-07-24 DIAGNOSIS — R92.30 DENSE BREAST TISSUE: ICD-10-CM

## 2023-07-24 PROCEDURE — 99213 OFFICE O/P EST LOW 20 MIN: CPT | Performed by: OBSTETRICS & GYNECOLOGY

## 2023-07-24 ASSESSMENT — PATIENT HEALTH QUESTIONNAIRE - PHQ9
SUM OF ALL RESPONSES TO PHQ9 QUESTIONS 1 AND 2: 0
2. FEELING DOWN, DEPRESSED OR HOPELESS: NOT AT ALL
1. LITTLE INTEREST OR PLEASURE IN DOING THINGS: NOT AT ALL
CLINICAL INTERPRETATION OF PHQ2 SCORE: NO FURTHER SCREENING NEEDED
SUM OF ALL RESPONSES TO PHQ9 QUESTIONS 1 AND 2: 0

## 2023-07-26 ENCOUNTER — TELEPHONE (OUTPATIENT)
Dept: OBGYN | Age: 68
End: 2023-07-26

## 2023-07-26 ENCOUNTER — LAB SERVICES (OUTPATIENT)
Dept: LAB | Age: 68
End: 2023-07-26

## 2023-07-26 DIAGNOSIS — R73.01 ELEVATED FASTING GLUCOSE: ICD-10-CM

## 2023-07-26 DIAGNOSIS — R23.2 HOT FLASHES: Primary | ICD-10-CM

## 2023-07-26 DIAGNOSIS — N95.1 MENOPAUSAL STATE: ICD-10-CM

## 2023-07-28 ENCOUNTER — LAB SERVICES (OUTPATIENT)
Dept: LAB | Age: 68
End: 2023-07-28

## 2023-07-28 DIAGNOSIS — R23.2 HOT FLASHES: ICD-10-CM

## 2023-07-28 DIAGNOSIS — N95.1 HOT FLASHES, MENOPAUSAL: ICD-10-CM

## 2023-07-28 DIAGNOSIS — R73.01 ELEVATED FASTING GLUCOSE: ICD-10-CM

## 2023-07-28 DIAGNOSIS — N95.1 MENOPAUSAL STATE: ICD-10-CM

## 2023-07-28 LAB
ESTRADIOL SERPL-MCNC: 52 PG/ML
FASTING DURATION TIME PATIENT: 15 HOURS (ref 0–999)
GLUCOSE SERPL-MCNC: 112 MG/DL (ref 70–99)
HBA1C MFR BLD: 6.2 % (ref 4.5–5.6)
T3 SERPL-MCNC: 0.85 NG/ML (ref 0.6–1.81)
T4 FREE SERPL-MCNC: 0.9 NG/DL (ref 0.8–1.5)
TSH SERPL-ACNC: 2.37 MCUNITS/ML (ref 0.35–5)

## 2023-07-28 PROCEDURE — 36415 COLL VENOUS BLD VENIPUNCTURE: CPT | Performed by: OBSTETRICS & GYNECOLOGY

## 2023-07-28 PROCEDURE — 84480 ASSAY TRIIODOTHYRONINE (T3): CPT | Performed by: CLINICAL MEDICAL LABORATORY

## 2023-07-28 PROCEDURE — 84443 ASSAY THYROID STIM HORMONE: CPT | Performed by: CLINICAL MEDICAL LABORATORY

## 2023-07-28 PROCEDURE — 84439 ASSAY OF FREE THYROXINE: CPT | Performed by: CLINICAL MEDICAL LABORATORY

## 2023-07-28 PROCEDURE — 82670 ASSAY OF TOTAL ESTRADIOL: CPT | Performed by: CLINICAL MEDICAL LABORATORY

## 2023-07-28 PROCEDURE — 83036 HEMOGLOBIN GLYCOSYLATED A1C: CPT | Performed by: CLINICAL MEDICAL LABORATORY

## 2023-07-28 PROCEDURE — 82947 ASSAY GLUCOSE BLOOD QUANT: CPT | Performed by: CLINICAL MEDICAL LABORATORY

## 2023-08-02 ENCOUNTER — APPOINTMENT (OUTPATIENT)
Dept: OBGYN | Age: 68
End: 2023-08-02

## 2023-08-08 ENCOUNTER — TELEPHONE (OUTPATIENT)
Dept: OBGYN | Age: 68
End: 2023-08-08

## 2023-10-09 ENCOUNTER — HOSPITAL ENCOUNTER (OUTPATIENT)
Dept: CT IMAGING | Age: 68
Discharge: HOME | End: 2023-10-09
Attending: OBSTETRICS & GYNECOLOGY

## 2023-10-09 DIAGNOSIS — Z12.31 ENCOUNTER FOR SCREENING MAMMOGRAM FOR MALIGNANT NEOPLASM OF BREAST: ICD-10-CM

## 2023-10-09 PROCEDURE — 77067 SCR MAMMO BI INCL CAD: CPT

## 2023-10-12 ENCOUNTER — APPOINTMENT (OUTPATIENT)
Dept: CT IMAGING | Age: 68
End: 2023-10-12
Attending: OBSTETRICS & GYNECOLOGY

## 2023-10-18 ENCOUNTER — HOSPITAL ENCOUNTER (OUTPATIENT)
Dept: CT IMAGING | Age: 68
Discharge: HOME | End: 2023-10-18
Attending: OBSTETRICS & GYNECOLOGY

## 2023-10-18 DIAGNOSIS — Z12.39 ENCOUNTER FOR BREAST CANCER SCREENING OTHER THAN MAMMOGRAM: ICD-10-CM

## 2023-10-18 DIAGNOSIS — R92.30 DENSE BREAST TISSUE: ICD-10-CM

## 2023-10-18 PROCEDURE — 76641 ULTRASOUND BREAST COMPLETE: CPT

## 2023-11-02 ENCOUNTER — APPOINTMENT (OUTPATIENT)
Dept: CT IMAGING | Age: 68
End: 2023-11-02
Attending: OBSTETRICS & GYNECOLOGY

## 2024-03-08 ENCOUNTER — TELEPHONE (OUTPATIENT)
Dept: OBGYN | Age: 69
End: 2024-03-08

## 2024-03-08 DIAGNOSIS — R92.8 ABNORMAL ULTRASOUND OF BREAST: Primary | ICD-10-CM

## 2024-05-13 ENCOUNTER — HOSPITAL ENCOUNTER (OUTPATIENT)
Dept: CT IMAGING | Age: 69
Discharge: HOME | End: 2024-05-13
Attending: OBSTETRICS & GYNECOLOGY

## 2024-05-13 DIAGNOSIS — R92.8 ABNORMAL ULTRASOUND OF BREAST: ICD-10-CM

## 2024-05-13 PROCEDURE — 76642 ULTRASOUND BREAST LIMITED: CPT

## 2024-07-25 ENCOUNTER — OFFICE VISIT (OUTPATIENT)
Dept: OBGYN | Age: 69
End: 2024-07-25

## 2024-07-25 VITALS
SYSTOLIC BLOOD PRESSURE: 110 MMHG | TEMPERATURE: 98.2 F | WEIGHT: 156.53 LBS | HEIGHT: 66 IN | BODY MASS INDEX: 25.16 KG/M2 | HEART RATE: 82 BPM | DIASTOLIC BLOOD PRESSURE: 72 MMHG

## 2024-07-25 DIAGNOSIS — Z12.31 ENCOUNTER FOR SCREENING MAMMOGRAM FOR MALIGNANT NEOPLASM OF BREAST: ICD-10-CM

## 2024-07-25 DIAGNOSIS — R92.30 BREAST DENSITY: Primary | ICD-10-CM

## 2024-07-25 RX ORDER — CYCLOSPORINE 0.5 MG/ML
EMULSION OPHTHALMIC
COMMUNITY
Start: 2024-07-13

## 2024-07-25 RX ORDER — ERYTHROMYCIN 5 MG/G
OINTMENT OPHTHALMIC
COMMUNITY
Start: 2024-05-24

## 2024-10-08 ENCOUNTER — HOSPITAL ENCOUNTER (OUTPATIENT)
Dept: CT IMAGING | Age: 69
Discharge: HOME | End: 2024-10-08
Attending: OBSTETRICS & GYNECOLOGY

## 2024-10-08 DIAGNOSIS — R92.30 BREAST DENSITY: ICD-10-CM

## 2024-10-08 DIAGNOSIS — Z12.31 ENCOUNTER FOR SCREENING MAMMOGRAM FOR MALIGNANT NEOPLASM OF BREAST: ICD-10-CM

## 2024-10-08 PROCEDURE — 76641 ULTRASOUND BREAST COMPLETE: CPT

## 2024-10-08 PROCEDURE — 77067 SCR MAMMO BI INCL CAD: CPT

## 2024-11-08 ENCOUNTER — E-ADVICE (OUTPATIENT)
Dept: ADMINISTRATIVE | Age: 69
End: 2024-11-08

## 2025-07-28 ENCOUNTER — APPOINTMENT (OUTPATIENT)
Dept: OBGYN | Age: 70
End: 2025-07-28

## 2025-07-28 VITALS
DIASTOLIC BLOOD PRESSURE: 60 MMHG | BODY MASS INDEX: 26.08 KG/M2 | WEIGHT: 162.26 LBS | SYSTOLIC BLOOD PRESSURE: 108 MMHG | HEIGHT: 66 IN | HEART RATE: 74 BPM

## 2025-07-28 DIAGNOSIS — N95.1 MENOPAUSAL SYMPTOMS: Primary | ICD-10-CM

## 2025-07-28 DIAGNOSIS — Z12.31 ENCOUNTER FOR SCREENING MAMMOGRAM FOR MALIGNANT NEOPLASM OF BREAST: ICD-10-CM

## 2025-07-28 DIAGNOSIS — Z78.0 POST-MENOPAUSAL: ICD-10-CM

## 2025-07-28 DIAGNOSIS — R92.30 DENSE BREAST: ICD-10-CM

## 2025-07-28 PROCEDURE — 99213 OFFICE O/P EST LOW 20 MIN: CPT | Performed by: OBSTETRICS & GYNECOLOGY

## 2025-07-28 PROCEDURE — 99459 PELVIC EXAMINATION: CPT | Performed by: OBSTETRICS & GYNECOLOGY

## 2025-07-28 RX ORDER — MIRABEGRON 50 MG/1
TABLET, FILM COATED, EXTENDED RELEASE ORAL
COMMUNITY
Start: 2024-09-15

## (undated) NOTE — LETTER
Patient Name: Vic Rose  YOB: 1955          MRN number:  [id number]  Date:  9/15/2022  Referring Physician:  Dc Paulino         MUSCULOSKELETAL AND PELVIC FLOOR EVALUATION:     Diagnosis:   Stress incontinence, female (N39.3)  Urge incontinence (N39.41)  Urinary frequency (R35.0)     Referring Provider: Cheli Askew  Date of Evaluation:    9/15/2022    Precautions:  None Next MD visit:   none scheduled  Date of Surgery: n/a     PATIENT SUMMARY   Vic Rose is a 69year old female  who presents to therapy today with complaints of urinary leaking with cough, sneeze and urgency. She also leaks with rotation during impact of hitting golf ball. She reports normal daily bowel movements. Current symptoms include: leakage    Pt describes pain level:  None     Pregnant Now: No  Obstetrical/Gynecological history:2 vaginal deliveries and 1  10 lbs third child  Urodynamic Test: yes, pt report she holds large amounts of fluid  Manometry: no  Occupation/Activities: retired,  golfer      POPDI-6 : 8.33  CRAD-8: 21.88  NATHALY-6: 54.17  PFDI-20 : 84.38/300    Sajan Santiago describes prior level of function used a panty liner. . Pt goals include less leaking anPast medical history was reviewed with Sajan Santiago. Significant findings include  R NEREYDA 2016, d use of panty liner. Patient   has a past medical history of BACK PAIN, Bursitis (2009), Esophageal reflux (10/24/2016), Herpes zoster without mention of complication, Hypothyroid (2009), HYPOTHYROIDISM, Migraine with aura (10/16/2018), Ocular migraine (10/16/2018), OTHER DISEASES (4/10), Other disorder of menstruation and other abnormal bleeding from female genital tract, and Visual changes (10/16/2018    URINARY HABITS  Types of symptoms: stress incontinence and urge incontinence : cough, sneeze, , golfing- impact, urge UI -small amounts - at least once day.    Abdominal/Vaginal Pressure complaints: No    Urinary Frequency:  2-3 hours  Amount:  filling panty liner  Pad use: Wearing mesh pad. Nocturia:  0 time    Fluid Intake:  large smoothly in morning 12- -16 oz, 1 bottle electrolytes 12 oz 2- 6 oz  Bladder irritants:  1-2 cups of decaf coffee  Post void dribble:  no  Empty bladder just in case: Yes  Do you ever leak urine without knowing it? no    BOWEL HABITS  Types of symptoms: Normal and sometimes a softer stool  Frequency of bowel movements:  1 time a day  Do you strain with defecation: Yes   Laxative use: No    SEXUAL HEALTH STATUS  Sexual [place] Status:  no  Pain with initial and/or deep penetration: NA  Pain with pelvic exam/tampon use: NA    ASSESSMENT  Daron Lu presents to physical therapy evaluation with primary c/o UI with cough, sneeze and urgency. The results of the objective tests and measures show pelvic floor weakness and poor bladder habits. Functional deficits include but are not limited to leaking with cough, sneeze and urgency . Signs and symptoms are consistent with diagnosis of \Stress incontinence, female (N39.3), Urge incontinence (N39.41), Urinary frequency (R35.0). Pt and PT discussed evaluation findings, pathology, POC and HEP. Pt voiced understanding and performs HEP correctly without reported pain. Skilled Pelvic Physical Therapy is medically necessary to address the above impairments and reach functional goals. Patient will benefit from therapy to receive manual therapy for joint and soft tissue manipulation; neuromuscular re-education for pelvic floor coordination and therapeutic exercise for internal and external stretching and functional strengthening. Pt will also benefit from bladder education including urge deferment. Progress might be delayed as patient may be traveling out of state to help daughter with arrival of first grandchild. If this is the case, virtual visits would be appropriate.      OBJECTIVE:       Informed consent for internal pelvic evaluation given: Yes    External Observation: Voluntary contraction: present   Voluntary relaxation: present  Involuntary contraction: present  Involuntary relaxation: present    Mons pubis: WNL  Labia majora: WNL  Labia minora: WNL  Urethral meatus: WNL  Introitus: WNL  Perineal body: WNL    Sensory/Reflex:  Vestibule: normal bilaterally  Anal [place]: normal bilaterally    Internal Examination   Scar: NA    Pelvic Floor Muscle strength: (PERF= Power/Endurance/Reps/Fast) MMT: 3+/10/6/not tested   External Anal Sphincter: NA  Accessory Muscle Use: abdominals    Tissue Laxity Test:  Anterior Wall: WNL  Posterior Wall: WNL  Apical: WNL      Internal Palpation: WNL except restriction [id number] and 100-200    Today's Treatment and Response:   Patient education was provided on objective findings of external and internal evaluation and expectations with treatment outcomes. Educated on pelvic anatomy and function with diagrams and pelvic model, adequate hydration levels, instructed in bladder, bowel, and diet diary log and issued handout  and knack/pelvic muscle brace    Patient was instructed in and issued a HEP for: diet/bladder/bowel diary. PF alanis and PF facilitation exercises     Charges: PT Eval Low Complexity, Ex 2      Total Timed Treatment: 90 min     Total Treatment Time: 90 min     PLAN OF CARE:    Goals: (to be met in 8 visits)    Patient will demonstrate pelvic floor contraction average 8.0 mvc in 10 sec and 10 reps to decrease urinary incontinence. Patient will demonstrate pelvic floor resting tone average <4.0 mvr to decrease urgency. Patient will be independent in appropriate bladder habits to reduce UI and urgency. Patient will report no leaking with urgency when utilizing urge deferment exercises. Frequency / Duration: Patient will be seen for 1  x/week or a total of 8 visits over a 90 day period.   Treatment will include: Manual Therapy, Neuromuscular Re-education, Therapeutic Exercise and Home Exercise Program instruction     Education or treatment limitation: None  Rehab Potential:good      Patient/Family/Caregiver was advised of these findings, precautions, and treatment options and has agreed to actively participate in planning and for this course of care. Thank you for your referral. Please co-sign or sign and return this letter via fax as soon as possible to [phone]. If you have any questions, please contact me at Dept: [phone]    Sincerely,  Electronically signed by therapist: Deven Carrera, PT  [de-identified] certification required: Yes  I certify the need for these services furnished under this plan of treatment and while under my care. X___________________________________________________ Date____________________    Certification From: 1/46/7130  To:12/14/2022 21st Century Cures Act Notice to Patient: Medical documents like this are made available to patients in the interest of transparency. However, be advised this is a medical document and it is intended as boax-ht-zopl communication between your medical providers. This medical document may contain abbreviations, assessments, medical data, and results or other terms that are unfamiliar. Medical documents are intended to carry relevant information, facts as evident, and the clinical opinion of the practitioner. As such, this medical document may be written in language that appears blunt or direct. You are encouraged to contact your medical provider and/or [facility] if you have any questions about this medical document.